# Patient Record
Sex: FEMALE | Race: WHITE | NOT HISPANIC OR LATINO | URBAN - METROPOLITAN AREA
[De-identification: names, ages, dates, MRNs, and addresses within clinical notes are randomized per-mention and may not be internally consistent; named-entity substitution may affect disease eponyms.]

---

## 2022-07-25 ENCOUNTER — APPOINTMENT (OUTPATIENT)
Dept: URBAN - METROPOLITAN AREA CLINIC 193 | Age: 77
Setting detail: DERMATOLOGY
End: 2022-07-25

## 2022-07-25 DIAGNOSIS — D22 MELANOCYTIC NEVI: ICD-10-CM

## 2022-07-25 DIAGNOSIS — L81.4 OTHER MELANIN HYPERPIGMENTATION: ICD-10-CM

## 2022-07-25 DIAGNOSIS — L56.5 DISSEMINATED SUPERFICIAL ACTINIC POROKERATOSIS (DSAP): ICD-10-CM

## 2022-07-25 DIAGNOSIS — L82.1 OTHER SEBORRHEIC KERATOSIS: ICD-10-CM

## 2022-07-25 DIAGNOSIS — L57.8 OTHER SKIN CHANGES DUE TO CHRONIC EXPOSURE TO NONIONIZING RADIATION: ICD-10-CM

## 2022-07-25 PROBLEM — D22.71 MELANOCYTIC NEVI OF RIGHT LOWER LIMB, INCLUDING HIP: Status: ACTIVE | Noted: 2022-07-25

## 2022-07-25 PROBLEM — D22.61 MELANOCYTIC NEVI OF RIGHT UPPER LIMB, INCLUDING SHOULDER: Status: ACTIVE | Noted: 2022-07-25

## 2022-07-25 PROBLEM — D22.5 MELANOCYTIC NEVI OF TRUNK: Status: ACTIVE | Noted: 2022-07-25

## 2022-07-25 PROBLEM — D22.72 MELANOCYTIC NEVI OF LEFT LOWER LIMB, INCLUDING HIP: Status: ACTIVE | Noted: 2022-07-25

## 2022-07-25 PROBLEM — D22.39 MELANOCYTIC NEVI OF OTHER PARTS OF FACE: Status: ACTIVE | Noted: 2022-07-25

## 2022-07-25 PROCEDURE — 99213 OFFICE O/P EST LOW 20 MIN: CPT

## 2022-07-25 PROCEDURE — OTHER COUNSELING: OTHER

## 2022-07-25 ASSESSMENT — LOCATION SIMPLE DESCRIPTION DERM
LOCATION SIMPLE: CHEST
LOCATION SIMPLE: RIGHT UPPER ARM
LOCATION SIMPLE: LEFT ANTERIOR NECK
LOCATION SIMPLE: LEFT PRETIBIAL REGION
LOCATION SIMPLE: RIGHT PRETIBIAL REGION
LOCATION SIMPLE: NOSE
LOCATION SIMPLE: LEFT THIGH
LOCATION SIMPLE: RIGHT THIGH

## 2022-07-25 ASSESSMENT — LOCATION DETAILED DESCRIPTION DERM
LOCATION DETAILED: RIGHT DISTAL PRETIBIAL REGION
LOCATION DETAILED: LEFT INFERIOR ANTERIOR NECK
LOCATION DETAILED: RIGHT ANTERIOR PROXIMAL UPPER ARM
LOCATION DETAILED: UPPER STERNUM
LOCATION DETAILED: LEFT ANTERIOR DISTAL THIGH
LOCATION DETAILED: RIGHT ANTERIOR PROXIMAL THIGH
LOCATION DETAILED: LEFT PROXIMAL PRETIBIAL REGION
LOCATION DETAILED: LEFT MEDIAL SUPERIOR CHEST
LOCATION DETAILED: NASAL DORSUM

## 2022-07-25 ASSESSMENT — LOCATION ZONE DERM
LOCATION ZONE: NOSE
LOCATION ZONE: NECK
LOCATION ZONE: TRUNK
LOCATION ZONE: ARM
LOCATION ZONE: LEG

## 2022-07-25 NOTE — HPI: EVALUATION OF SKIN LESION(S)
Hpi Title: Evaluation of Skin Lesions
Additional History: They’ve been present for years with no previous treatment. Patient has no personal history of skin cancer.

## 2023-07-25 ENCOUNTER — APPOINTMENT (OUTPATIENT)
Dept: URBAN - METROPOLITAN AREA CLINIC 193 | Age: 78
Setting detail: DERMATOLOGY
End: 2023-07-25

## 2023-07-25 DIAGNOSIS — L82.1 OTHER SEBORRHEIC KERATOSIS: ICD-10-CM

## 2023-07-25 DIAGNOSIS — L81.4 OTHER MELANIN HYPERPIGMENTATION: ICD-10-CM

## 2023-07-25 DIAGNOSIS — L56.5 DISSEMINATED SUPERFICIAL ACTINIC POROKERATOSIS (DSAP): ICD-10-CM

## 2023-07-25 DIAGNOSIS — L57.8 OTHER SKIN CHANGES DUE TO CHRONIC EXPOSURE TO NONIONIZING RADIATION: ICD-10-CM

## 2023-07-25 DIAGNOSIS — D22 MELANOCYTIC NEVI: ICD-10-CM

## 2023-07-25 PROBLEM — D22.71 MELANOCYTIC NEVI OF RIGHT LOWER LIMB, INCLUDING HIP: Status: ACTIVE | Noted: 2023-07-25

## 2023-07-25 PROBLEM — D22.72 MELANOCYTIC NEVI OF LEFT LOWER LIMB, INCLUDING HIP: Status: ACTIVE | Noted: 2023-07-25

## 2023-07-25 PROBLEM — D22.39 MELANOCYTIC NEVI OF OTHER PARTS OF FACE: Status: ACTIVE | Noted: 2023-07-25

## 2023-07-25 PROBLEM — D22.5 MELANOCYTIC NEVI OF TRUNK: Status: ACTIVE | Noted: 2023-07-25

## 2023-07-25 PROBLEM — D22.61 MELANOCYTIC NEVI OF RIGHT UPPER LIMB, INCLUDING SHOULDER: Status: ACTIVE | Noted: 2023-07-25

## 2023-07-25 PROCEDURE — 17000 DESTRUCT PREMALG LESION: CPT

## 2023-07-25 PROCEDURE — OTHER MIPS QUALITY: OTHER

## 2023-07-25 PROCEDURE — OTHER COUNSELING: OTHER

## 2023-07-25 PROCEDURE — 99213 OFFICE O/P EST LOW 20 MIN: CPT | Mod: 25

## 2023-07-25 PROCEDURE — OTHER LIQUID NITROGEN: OTHER

## 2023-07-25 ASSESSMENT — LOCATION SIMPLE DESCRIPTION DERM
LOCATION SIMPLE: LEFT PRETIBIAL REGION
LOCATION SIMPLE: LEFT THIGH
LOCATION SIMPLE: NOSE
LOCATION SIMPLE: RIGHT UPPER ARM
LOCATION SIMPLE: RIGHT PRETIBIAL REGION
LOCATION SIMPLE: RIGHT THIGH
LOCATION SIMPLE: LEFT ANTERIOR NECK
LOCATION SIMPLE: CHEST

## 2023-07-25 ASSESSMENT — LOCATION DETAILED DESCRIPTION DERM
LOCATION DETAILED: RIGHT ANTERIOR PROXIMAL THIGH
LOCATION DETAILED: LEFT PROXIMAL PRETIBIAL REGION
LOCATION DETAILED: LEFT INFERIOR ANTERIOR NECK
LOCATION DETAILED: RIGHT DISTAL PRETIBIAL REGION
LOCATION DETAILED: LEFT MEDIAL SUPERIOR CHEST
LOCATION DETAILED: UPPER STERNUM
LOCATION DETAILED: LEFT ANTERIOR DISTAL THIGH
LOCATION DETAILED: NASAL DORSUM
LOCATION DETAILED: RIGHT ANTERIOR PROXIMAL UPPER ARM

## 2023-07-25 ASSESSMENT — LOCATION ZONE DERM
LOCATION ZONE: NOSE
LOCATION ZONE: LEG
LOCATION ZONE: ARM
LOCATION ZONE: NECK
LOCATION ZONE: TRUNK

## 2023-07-25 NOTE — PROCEDURE: LIQUID NITROGEN
Render Note In Bullet Format When Appropriate: No
Duration Of Freeze Thaw-Cycle (Seconds): 3
Post-Care Instructions: I reviewed with the patient in detail post-care instructions. Patient is to wear sunprotection, and avoid picking at any of the treated lesions. Pt may apply Vaseline to crusted or scabbing areas.
Render Post-Care Instructions In Note?: yes
Detail Level: Detailed
Number Of Freeze-Thaw Cycles: 2 freeze-thaw cycles
Consent: The patient's consent was obtained including but not limited to risks of crusting, scabbing, blistering, scarring, darker or lighter pigmentary change, recurrence, incomplete removal and infection.

## 2023-07-25 NOTE — HPI: EVALUATION OF SKIN LESION(S)
How Severe Are Your Spot(S)?: moderate
Hpi Title: Evaluation of Skin Lesions
Additional History: Patient presents today for a tbse, no hx of skin cancer, patient would like all moles to be evaluated.\\n\\nPatient reports no concerns for today.

## 2024-07-25 ENCOUNTER — APPOINTMENT (OUTPATIENT)
Dept: URBAN - METROPOLITAN AREA CLINIC 193 | Age: 79
Setting detail: DERMATOLOGY
End: 2024-07-25

## 2024-07-25 DIAGNOSIS — L81.4 OTHER MELANIN HYPERPIGMENTATION: ICD-10-CM

## 2024-07-25 DIAGNOSIS — L82.1 OTHER SEBORRHEIC KERATOSIS: ICD-10-CM

## 2024-07-25 DIAGNOSIS — D22 MELANOCYTIC NEVI: ICD-10-CM

## 2024-07-25 DIAGNOSIS — L57.8 OTHER SKIN CHANGES DUE TO CHRONIC EXPOSURE TO NONIONIZING RADIATION: ICD-10-CM

## 2024-07-25 PROBLEM — D22.71 MELANOCYTIC NEVI OF RIGHT LOWER LIMB, INCLUDING HIP: Status: ACTIVE | Noted: 2024-07-25

## 2024-07-25 PROBLEM — D22.61 MELANOCYTIC NEVI OF RIGHT UPPER LIMB, INCLUDING SHOULDER: Status: ACTIVE | Noted: 2024-07-25

## 2024-07-25 PROBLEM — D22.39 MELANOCYTIC NEVI OF OTHER PARTS OF FACE: Status: ACTIVE | Noted: 2024-07-25

## 2024-07-25 PROBLEM — D22.72 MELANOCYTIC NEVI OF LEFT LOWER LIMB, INCLUDING HIP: Status: ACTIVE | Noted: 2024-07-25

## 2024-07-25 PROBLEM — D22.5 MELANOCYTIC NEVI OF TRUNK: Status: ACTIVE | Noted: 2024-07-25

## 2024-07-25 PROCEDURE — OTHER COUNSELING: OTHER

## 2024-07-25 PROCEDURE — 99213 OFFICE O/P EST LOW 20 MIN: CPT

## 2024-07-25 ASSESSMENT — LOCATION ZONE DERM
LOCATION ZONE: ARM
LOCATION ZONE: NECK
LOCATION ZONE: LEG
LOCATION ZONE: NOSE
LOCATION ZONE: TRUNK

## 2024-07-25 ASSESSMENT — LOCATION DETAILED DESCRIPTION DERM
LOCATION DETAILED: LEFT MEDIAL SUPERIOR CHEST
LOCATION DETAILED: LEFT PROXIMAL PRETIBIAL REGION
LOCATION DETAILED: RIGHT ANTERIOR PROXIMAL UPPER ARM
LOCATION DETAILED: UPPER STERNUM
LOCATION DETAILED: RIGHT ANTERIOR PROXIMAL THIGH
LOCATION DETAILED: LEFT ANTERIOR DISTAL THIGH
LOCATION DETAILED: NASAL DORSUM
LOCATION DETAILED: LEFT INFERIOR ANTERIOR NECK

## 2024-07-25 ASSESSMENT — LOCATION SIMPLE DESCRIPTION DERM
LOCATION SIMPLE: CHEST
LOCATION SIMPLE: LEFT THIGH
LOCATION SIMPLE: LEFT ANTERIOR NECK
LOCATION SIMPLE: RIGHT THIGH
LOCATION SIMPLE: NOSE
LOCATION SIMPLE: RIGHT UPPER ARM
LOCATION SIMPLE: LEFT PRETIBIAL REGION

## 2025-06-03 ENCOUNTER — APPOINTMENT (OUTPATIENT)
Dept: URBAN - METROPOLITAN AREA CLINIC 193 | Age: 80
Setting detail: DERMATOLOGY
End: 2025-06-03

## 2025-06-03 DIAGNOSIS — B07.8 OTHER VIRAL WARTS: ICD-10-CM

## 2025-06-03 DIAGNOSIS — L40.8 OTHER PSORIASIS: ICD-10-CM

## 2025-06-03 PROCEDURE — OTHER PRESCRIPTION: OTHER

## 2025-06-03 PROCEDURE — OTHER COUNSELING: OTHER

## 2025-06-03 PROCEDURE — OTHER LIQUID NITROGEN: OTHER

## 2025-06-03 PROCEDURE — 17110 DESTRUCT B9 LESION 1-14: CPT

## 2025-06-03 PROCEDURE — 99214 OFFICE O/P EST MOD 30 MIN: CPT | Mod: 25

## 2025-06-03 PROCEDURE — OTHER PRESCRIPTION MEDICATION MANAGEMENT: OTHER

## 2025-06-03 RX ORDER — TRIAMCINOLONE ACETONIDE 1 MG/G
OINTMENT TOPICAL
Qty: 80 | Refills: 1 | Status: ERX | COMMUNITY
Start: 2025-06-03

## 2025-06-03 ASSESSMENT — LOCATION SIMPLE DESCRIPTION DERM
LOCATION SIMPLE: BUTTOCKS
LOCATION SIMPLE: RIGHT KNEE

## 2025-06-03 ASSESSMENT — LOCATION DETAILED DESCRIPTION DERM
LOCATION DETAILED: RIGHT KNEE
LOCATION DETAILED: GLUTEAL CLEFT

## 2025-06-03 ASSESSMENT — BSA PSORIASIS: % BODY COVERED IN PSORIASIS: 3

## 2025-06-03 ASSESSMENT — ITCH NUMERIC RATING SCALE: HOW SEVERE IS YOUR ITCHING?: 1

## 2025-06-03 ASSESSMENT — LOCATION ZONE DERM
LOCATION ZONE: LEG
LOCATION ZONE: TRUNK

## 2025-06-03 ASSESSMENT — PGA PSORIASIS: PGA PSORIASIS 2020: MILD

## 2025-07-29 ENCOUNTER — APPOINTMENT (OUTPATIENT)
Dept: URBAN - METROPOLITAN AREA CLINIC 193 | Age: 80
Setting detail: DERMATOLOGY
End: 2025-07-29

## 2025-07-29 DIAGNOSIS — L40.8 OTHER PSORIASIS: ICD-10-CM

## 2025-07-29 DIAGNOSIS — L81.4 OTHER MELANIN HYPERPIGMENTATION: ICD-10-CM

## 2025-07-29 DIAGNOSIS — Z71.89 OTHER SPECIFIED COUNSELING: ICD-10-CM

## 2025-07-29 DIAGNOSIS — L57.8 OTHER SKIN CHANGES DUE TO CHRONIC EXPOSURE TO NONIONIZING RADIATION: ICD-10-CM

## 2025-07-29 DIAGNOSIS — D22 MELANOCYTIC NEVI: ICD-10-CM

## 2025-07-29 DIAGNOSIS — B07.8 OTHER VIRAL WARTS: ICD-10-CM

## 2025-07-29 DIAGNOSIS — L82.1 OTHER SEBORRHEIC KERATOSIS: ICD-10-CM

## 2025-07-29 PROBLEM — D22.5 MELANOCYTIC NEVI OF TRUNK: Status: ACTIVE | Noted: 2025-07-29

## 2025-07-29 PROBLEM — D22.71 MELANOCYTIC NEVI OF RIGHT LOWER LIMB, INCLUDING HIP: Status: ACTIVE | Noted: 2025-07-29

## 2025-07-29 PROBLEM — D22.61 MELANOCYTIC NEVI OF RIGHT UPPER LIMB, INCLUDING SHOULDER: Status: ACTIVE | Noted: 2025-07-29

## 2025-07-29 PROBLEM — D22.39 MELANOCYTIC NEVI OF OTHER PARTS OF FACE: Status: ACTIVE | Noted: 2025-07-29

## 2025-07-29 PROBLEM — D22.72 MELANOCYTIC NEVI OF LEFT LOWER LIMB, INCLUDING HIP: Status: ACTIVE | Noted: 2025-07-29

## 2025-07-29 PROCEDURE — OTHER MIPS QUALITY: OTHER

## 2025-07-29 PROCEDURE — OTHER LIQUID NITROGEN: OTHER

## 2025-07-29 PROCEDURE — 99213 OFFICE O/P EST LOW 20 MIN: CPT | Mod: 25

## 2025-07-29 PROCEDURE — OTHER COUNSELING: OTHER

## 2025-07-29 PROCEDURE — OTHER PRESCRIPTION MEDICATION MANAGEMENT: OTHER

## 2025-07-29 PROCEDURE — 17110 DESTRUCT B9 LESION 1-14: CPT

## 2025-07-29 ASSESSMENT — LOCATION SIMPLE DESCRIPTION DERM
LOCATION SIMPLE: RIGHT HAND
LOCATION SIMPLE: LEFT ANTERIOR NECK
LOCATION SIMPLE: LEFT THIGH
LOCATION SIMPLE: NOSE
LOCATION SIMPLE: RIGHT UPPER ARM
LOCATION SIMPLE: RIGHT THIGH
LOCATION SIMPLE: CHEST
LOCATION SIMPLE: BUTTOCKS
LOCATION SIMPLE: LEFT PRETIBIAL REGION

## 2025-07-29 ASSESSMENT — LOCATION ZONE DERM
LOCATION ZONE: TRUNK
LOCATION ZONE: NECK
LOCATION ZONE: LEG
LOCATION ZONE: ARM
LOCATION ZONE: NOSE
LOCATION ZONE: HAND

## 2025-07-29 ASSESSMENT — LOCATION DETAILED DESCRIPTION DERM
LOCATION DETAILED: LEFT MEDIAL SUPERIOR CHEST
LOCATION DETAILED: RIGHT RADIAL DORSAL HAND
LOCATION DETAILED: RIGHT ANTERIOR PROXIMAL THIGH
LOCATION DETAILED: GLUTEAL CLEFT
LOCATION DETAILED: NASAL DORSUM
LOCATION DETAILED: UPPER STERNUM
LOCATION DETAILED: RIGHT ANTERIOR PROXIMAL UPPER ARM
LOCATION DETAILED: LEFT INFERIOR ANTERIOR NECK
LOCATION DETAILED: LEFT ANTERIOR DISTAL THIGH
LOCATION DETAILED: LEFT PROXIMAL PRETIBIAL REGION

## 2025-07-29 ASSESSMENT — ITCH NUMERIC RATING SCALE: HOW SEVERE IS YOUR ITCHING?: 0

## 2025-07-29 ASSESSMENT — BSA PSORIASIS: % BODY COVERED IN PSORIASIS: 0
